# Patient Record
Sex: FEMALE | Race: WHITE | Employment: FULL TIME | URBAN - METROPOLITAN AREA
[De-identification: names, ages, dates, MRNs, and addresses within clinical notes are randomized per-mention and may not be internally consistent; named-entity substitution may affect disease eponyms.]

---

## 2017-12-28 ENCOUNTER — OFFICE VISIT (OUTPATIENT)
Dept: FAMILY MEDICINE CLINIC | Age: 27
End: 2017-12-28

## 2017-12-28 VITALS
BODY MASS INDEX: 37.22 KG/M2 | SYSTOLIC BLOOD PRESSURE: 134 MMHG | HEIGHT: 64 IN | TEMPERATURE: 97.9 F | WEIGHT: 218 LBS | DIASTOLIC BLOOD PRESSURE: 84 MMHG

## 2017-12-28 DIAGNOSIS — L72.3 SEBACEOUS CYST: Primary | ICD-10-CM

## 2017-12-28 DIAGNOSIS — R63.5 WEIGHT GAIN: ICD-10-CM

## 2017-12-28 LAB
GLUCOSE BLD-MCNC: 82 MG/DL (ref 70–99)
TSH SERPL DL<=0.05 MIU/L-ACNC: 2.66 UIU/ML (ref 0.27–4.2)

## 2017-12-28 PROCEDURE — G8484 FLU IMMUNIZE NO ADMIN: HCPCS | Performed by: FAMILY MEDICINE

## 2017-12-28 PROCEDURE — G8427 DOCREV CUR MEDS BY ELIG CLIN: HCPCS | Performed by: FAMILY MEDICINE

## 2017-12-28 PROCEDURE — 99213 OFFICE O/P EST LOW 20 MIN: CPT | Performed by: FAMILY MEDICINE

## 2017-12-28 PROCEDURE — 1036F TOBACCO NON-USER: CPT | Performed by: FAMILY MEDICINE

## 2017-12-28 PROCEDURE — G8417 CALC BMI ABV UP PARAM F/U: HCPCS | Performed by: FAMILY MEDICINE

## 2017-12-28 RX ORDER — CEPHALEXIN 500 MG/1
500 CAPSULE ORAL 3 TIMES DAILY
Qty: 30 CAPSULE | Refills: 0 | Status: SHIPPED | OUTPATIENT
Start: 2017-12-28 | End: 2018-01-29 | Stop reason: CLARIF

## 2017-12-28 RX ORDER — MELOXICAM 15 MG/1
15 TABLET ORAL
COMMUNITY
Start: 2017-10-24 | End: 2018-10-29 | Stop reason: CLARIF

## 2017-12-28 RX ORDER — METHOCARBAMOL 750 MG/1
750 TABLET, FILM COATED ORAL
COMMUNITY
Start: 2016-11-22 | End: 2018-10-29 | Stop reason: CLARIF

## 2017-12-29 LAB
ESTIMATED AVERAGE GLUCOSE: 111.2 MG/DL
HBA1C MFR BLD: 5.5 %

## 2018-01-29 ENCOUNTER — INITIAL CONSULT (OUTPATIENT)
Dept: SURGERY | Age: 28
End: 2018-01-29

## 2018-01-29 VITALS
DIASTOLIC BLOOD PRESSURE: 80 MMHG | HEIGHT: 64 IN | WEIGHT: 218 LBS | BODY MASS INDEX: 37.22 KG/M2 | SYSTOLIC BLOOD PRESSURE: 136 MMHG

## 2018-01-29 DIAGNOSIS — L98.9 SKIN LESION: Primary | ICD-10-CM

## 2018-01-29 PROCEDURE — 99243 OFF/OP CNSLTJ NEW/EST LOW 30: CPT | Performed by: SURGERY

## 2018-01-29 PROCEDURE — G8484 FLU IMMUNIZE NO ADMIN: HCPCS | Performed by: SURGERY

## 2018-01-29 PROCEDURE — G8417 CALC BMI ABV UP PARAM F/U: HCPCS | Performed by: SURGERY

## 2018-01-29 PROCEDURE — G8427 DOCREV CUR MEDS BY ELIG CLIN: HCPCS | Performed by: SURGERY

## 2018-02-01 ASSESSMENT — ENCOUNTER SYMPTOMS: COLOR CHANGE: 1

## 2018-02-01 NOTE — PROGRESS NOTES
Neck: Normal range of motion. Neck supple. No tracheal deviation present. No thyromegaly present. Cardiovascular: Normal rate and normal heart sounds. No murmur heard. Pulmonary/Chest: Effort normal and breath sounds normal.   Abdominal: Soft. She exhibits no distension. There is no tenderness. There is no guarding. Musculoskeletal: Normal range of motion. She exhibits no edema. Neurological: She is alert and oriented to person, place, and time. Skin: Skin is warm and dry. Psychiatric: She has a normal mood and affect. Thought content normal.       Assessment:      1. Skin lesion             Plan:      Removal in OR    The risks, benefits and alternatives to the planned procedure were discussed. Patient expressed an understanding and is willing to proceed.

## 2018-02-02 ENCOUNTER — HOSPITAL ENCOUNTER (OUTPATIENT)
Dept: SURGERY | Age: 28
Discharge: HOME | End: 2018-02-02
Attending: SURGERY | Admitting: SURGERY

## 2018-02-02 VITALS
TEMPERATURE: 97 F | HEART RATE: 69 BPM | BODY MASS INDEX: 37.22 KG/M2 | SYSTOLIC BLOOD PRESSURE: 104 MMHG | WEIGHT: 218 LBS | DIASTOLIC BLOOD PRESSURE: 62 MMHG | OXYGEN SATURATION: 99 % | RESPIRATION RATE: 18 BRPM | HEIGHT: 64 IN

## 2018-02-02 DIAGNOSIS — L98.9 SKIN LESION: Primary | ICD-10-CM

## 2018-02-02 LAB — PREGNANCY, URINE: NEGATIVE

## 2018-02-02 PROCEDURE — 11401 EXC TR-EXT B9+MARG 0.6-1 CM: CPT | Performed by: SURGERY

## 2018-02-02 RX ORDER — SODIUM CHLORIDE 9 MG/ML
INJECTION, SOLUTION INTRAVENOUS CONTINUOUS
Status: DISCONTINUED | OUTPATIENT
Start: 2018-02-02 | End: 2018-02-03 | Stop reason: HOSPADM

## 2018-02-02 RX ORDER — OXYCODONE HYDROCHLORIDE AND ACETAMINOPHEN 5; 325 MG/1; MG/1
2 TABLET ORAL PRN
Status: COMPLETED | OUTPATIENT
Start: 2018-02-02 | End: 2018-02-02

## 2018-02-02 RX ORDER — TRAMADOL HYDROCHLORIDE 50 MG/1
50 TABLET ORAL EVERY 6 HOURS PRN
Qty: 10 TABLET | Refills: 0 | Status: SHIPPED | OUTPATIENT
Start: 2018-02-02 | End: 2018-02-12

## 2018-02-02 RX ORDER — ONDANSETRON 2 MG/ML
4 INJECTION INTRAMUSCULAR; INTRAVENOUS
Status: ACTIVE | OUTPATIENT
Start: 2018-02-02 | End: 2018-02-02

## 2018-02-02 RX ORDER — SODIUM CHLORIDE 0.9 % (FLUSH) 0.9 %
10 SYRINGE (ML) INJECTION EVERY 12 HOURS SCHEDULED
Status: DISCONTINUED | OUTPATIENT
Start: 2018-02-02 | End: 2018-02-03 | Stop reason: HOSPADM

## 2018-02-02 RX ORDER — OXYCODONE HYDROCHLORIDE AND ACETAMINOPHEN 5; 325 MG/1; MG/1
1 TABLET ORAL PRN
Status: COMPLETED | OUTPATIENT
Start: 2018-02-02 | End: 2018-02-02

## 2018-02-02 RX ORDER — FENTANYL CITRATE 50 UG/ML
25 INJECTION, SOLUTION INTRAMUSCULAR; INTRAVENOUS EVERY 5 MIN PRN
Status: DISCONTINUED | OUTPATIENT
Start: 2018-02-02 | End: 2018-02-03 | Stop reason: HOSPADM

## 2018-02-02 RX ORDER — SODIUM CHLORIDE 0.9 % (FLUSH) 0.9 %
10 SYRINGE (ML) INJECTION PRN
Status: DISCONTINUED | OUTPATIENT
Start: 2018-02-02 | End: 2018-02-03 | Stop reason: HOSPADM

## 2018-02-02 RX ADMIN — SODIUM CHLORIDE: 9 INJECTION, SOLUTION INTRAVENOUS at 09:36

## 2018-02-02 RX ADMIN — OXYCODONE HYDROCHLORIDE AND ACETAMINOPHEN 1 TABLET: 5; 325 TABLET ORAL at 11:21

## 2018-02-02 ASSESSMENT — PAIN - FUNCTIONAL ASSESSMENT: PAIN_FUNCTIONAL_ASSESSMENT: 0-10

## 2018-02-02 ASSESSMENT — PAIN DESCRIPTION - FREQUENCY
FREQUENCY: CONTINUOUS
FREQUENCY: CONTINUOUS

## 2018-02-02 ASSESSMENT — PAIN DESCRIPTION - PROGRESSION
CLINICAL_PROGRESSION: GRADUALLY WORSENING
CLINICAL_PROGRESSION: NOT CHANGED

## 2018-02-02 ASSESSMENT — PAIN DESCRIPTION - LOCATION: LOCATION: INCISION;LEG

## 2018-02-02 ASSESSMENT — PAIN DESCRIPTION - PAIN TYPE
TYPE: SURGICAL PAIN
TYPE: SURGICAL PAIN

## 2018-02-02 ASSESSMENT — LIFESTYLE VARIABLES: SMOKING_STATUS: 1

## 2018-02-02 ASSESSMENT — PAIN SCALES - GENERAL
PAINLEVEL_OUTOF10: 4
PAINLEVEL_OUTOF10: 5
PAINLEVEL_OUTOF10: 5

## 2018-02-02 ASSESSMENT — ENCOUNTER SYMPTOMS: SHORTNESS OF BREATH: 0

## 2018-02-02 ASSESSMENT — PAIN DESCRIPTION - DESCRIPTORS: DESCRIPTORS: ACHING

## 2018-02-02 ASSESSMENT — PAIN DESCRIPTION - ORIENTATION: ORIENTATION: RIGHT

## 2018-02-02 NOTE — PROGRESS NOTES
Meets criteria for discharge from PACU. Will call for phase II bed.
Pt. Arrived in PACU. Awake, talking. On RA. Says, \"it was the best sleep ever. \" Incision in Rt. Groin intact. No drainage.
Received from PACU. Admitted to Phase 2 care. Awake and alert, respirations easy and even. Oriented to room and surroundings. States pain is tolerable presently.
Tolerating oral intake and being up in chair. Denies need. Discharge instructions given to patient and mother. Verbalize understanding.
established preoperatively. 23.  The patient/caregiver demonstrates knowledge of the expected responses to the operative or invasive procedure. 20.  Patient/Caregiver has reduced anxiety. Interventions-Familiarize with environment and equipment.   21.  Other:  22.  Other:

## 2018-02-02 NOTE — ANESTHESIA PRE-OP
136/80   12/28/17 134/84   03/07/16 110/72     BMI  There is no height or weight on file to calculate BMI. Estimated body mass index is 37.42 kg/m² as calculated from the following:    Height as of 1/29/18: 5' 4\" (1.626 m). Weight as of 1/29/18: 218 lb (98.9 kg). CBC No results found for: WBC, RBC, HGB, HCT, MCV, RDW, PLT  CMP    Lab Results   Component Value Date    GLUCOSE 82 12/28/2017     BMP    Lab Results   Component Value Date    GLUCOSE 82 12/28/2017     POCGlucose  No results for input(s): GLUCOSE in the last 72 hours. Coags  No results found for: PROTIME, INR, APTT  HCG (If Applicable) No results found for: PREGTESTUR, PREGSERUM, HCG, HCGQUANT   ABGs No results found for: PHART, PO2ART, WWO8SDO, KTS2BMR, BEART, T8ETDCGB   Type & Screen (If Applicable)  No results found for: LABABO, LABRH                         BMI: Wt Readings from Last 3 Encounters:       NPO Status: >8hrs                           Anesthesia Evaluation  Patient summary reviewed no history of anesthetic complications:   Airway: Mallampati: I  TM distance: >3 FB   Neck ROM: full  Mouth opening: > = 3 FB Dental:      Comment: No loose teeth    Pulmonary: breath sounds clear to auscultation  (+) current smoker    (-) COPD, asthma, shortness of breath, recent URI and sleep apnea          Patient smoked on day of surgery.                  Cardiovascular:        (-) hypertension, valvular problems/murmurs, past MI, CAD, CABG/stent, dysrhythmias,  angina,  CHF, orthopnea, PND and murmur      Rhythm: regular  Rate: normal                    Neuro/Psych:      (-) seizures, neuromuscular disease, TIA, CVA, headaches and psychiatric history           GI/Hepatic/Renal:        (-) GERD, PUD, hepatitis, liver disease, no renal disease and bowel prep       Endo/Other:        (-) hypothyroidism, hyperthyroidism, blood dyscrasia, arthritis, no Type II DM, no Type I DM               Abdominal:           Vascular:

## 2018-02-02 NOTE — ANESTHESIA POST-OP
[facility] Department of Anesthesiology  Post-Anesthesia Note       Name:  Eleno Guzmán                                         Age:  33 y.o.   MRN:  [id number]     Last Vitals & Oxygen Saturation: /62   Pulse 69   Temp 97 °F (36.1 °C) (Temporal)   Resp 18   Ht 5' 4\" (1.626 m)   Wt 218 lb (98.9 kg)   SpO2 99%   BMI 37.42 kg/m²   Patient Vitals for the past 4 hrs:   BP Temp Temp src Pulse Resp SpO2 Height Weight   02/02/18 1144 104/62 - - 69 18 99 % - -   02/02/18 1055 (!) 116/58 97 °F (36.1 °C) Temporal 71 18 98 % - -   02/02/18 1045 113/77 97 °F (36.1 °C) Temporal 77 21 97 % - -   02/02/18 1041 111/73 - - 84 23 98 % - -   02/02/18 1037 118/83 - - 89 14 98 % - -   02/02/18 1035 127/81 97 °F (36.1 °C) Temporal 85 19 97 % - -   02/02/18 0919 118/80 98.1 °F (36.7 °C) Oral 89 16 96 % 5' 4\" (1.626 m) 218 lb (98.9 kg)       Level of consciousness: awake, alert and oriented    Respiratory: stable     Cardiovascular: stable     Hydration: stable     PONV: stable     Post-op pain: adequate analgesia    Post-op assessment: no apparent anesthetic complications    Complications:  none    Yvonne Mendes MD  February 2, 2018   12:48 PM

## 2018-02-02 NOTE — BRIEF OP NOTE
Brief Postoperative Note    Mariana Vega  YOB: 1990  [id number]    Pre-operative Diagnosis: skin lesion right groin    Post-operative Diagnosis: Same    Procedure: removal of skin lesion right groin 1 cm    Anesthesia: MAC and Local    Surgeons/Assistants: Perla Ritter    Estimated Blood Loss: less than 50     Complications: None    Specimens: Was Obtained: skin lesion    Findings: no complications    Electronically signed by Fabio Ramos MD on 2/2/2018 at 10:26 AM

## 2018-02-03 NOTE — OP NOTE
[address] [place] [address]                                 OPERATIVE REPORT    PATIENT NAME: Rishi Mcmahon                 :        1990  MED REC NO:   [id number]                          ROOM:  ACCOUNT NO:   [de-identified]                          ADMIT DATE: 2018  PROVIDER:     Maricel Mendes MD    DATE OF PROCEDURE:  2018    PREOPERATIVE DIAGNOSIS:  Skin lesion, right groin. POSTOPERATIVE DIAGNOSIS:  Skin lesion, right groin. PROCEDURE PERFORMED:  Removal of skin lesion, right groin, 1 cm. SURGEON:  Maricel Mendes MD    SPECIMEN:  Skin lesion. COMPLICATIONS:  None. DISPOSITION:  To recovery in stable condition. INDICATION:  The patient is a 72-year-old female who has noted intermittent  flaring of a skin lesion in the right groin towards the perineum. She has  had at least three to four flare-ups over the last year. These have  resolved, however, there is a residual lesion, likely a sebaceous cyst  versus an area of hidradenitis in the right groin. The risks, benefits,  alternatives of removal were reviewed and she agreed to proceed. PROCEDURE:  The patient was brought to the operating room, placed supine,  sedation delivered. She was repositioned into lithotomy and the right  groin and perineum prepped and draped in a sterile fashion. Local  anesthetic was infused and an elliptical incision measuring 1.5 x 1 cm was  made around an obvious cystic lesion in the right groin limited to the  skin. This was carried into the deep subcutaneous layer and excised. Hemostasis was achieved and the wound was closed with 3-0 and 4-0 Vicryl  followed by [facility]. The patient was then transferred to recovery in  stable condition.         Erica Rayo MD    D: 2018 10:41:51       T: 2018 10:42:50     [id number]  Job#: [id number]     Doc#: [id number]    CC:

## 2018-03-05 ENCOUNTER — OFFICE VISIT (OUTPATIENT)
Dept: SURGERY | Age: 28
End: 2018-03-05

## 2018-03-05 DIAGNOSIS — L98.9 SKIN LESION: Primary | ICD-10-CM

## 2018-03-05 PROCEDURE — 99024 POSTOP FOLLOW-UP VISIT: CPT | Performed by: SURGERY

## 2018-03-05 NOTE — LETTER
[facility]  [address] [place] [id number]  Phone: [phone]  Fax: [phone]    Wilmer Colon MD        March 5, 2018     Patient: Hamzah Lopez   YOB: 1990   Date of Visit: 3/5/2018       To Whom It May Concern: It is my medical opinion that Candelaria Whittington was seen in the office today for a postop visit. Please excuse. If you have any questions or concerns, please don't hesitate to call.     Sincerely,        Wilmer Colon MD

## 2018-03-19 NOTE — PROGRESS NOTES
Subjective:      Patient ID: Era Parisi is a 33 y.o. female. HPI  Cyst removal site is clear. Well healed. Path negative for malignancy (inclusion cyst). Follow up with me as needed    Review of Systems    Objective:   Physical Exam    Assessment:      1.  Skin lesion             Plan:      Follow up with me as needed

## 2018-08-26 ENCOUNTER — HOSPITAL ENCOUNTER (EMERGENCY)
Age: 28
Discharge: HOME | End: 2018-08-26
Attending: EMERGENCY MEDICINE
Payer: COMMERCIAL

## 2018-08-26 VITALS
HEART RATE: 88 BPM | DIASTOLIC BLOOD PRESSURE: 72 MMHG | SYSTOLIC BLOOD PRESSURE: 119 MMHG | RESPIRATION RATE: 15 BRPM | BODY MASS INDEX: 38.09 KG/M2 | TEMPERATURE: 98.5 F | WEIGHT: 215 LBS | HEIGHT: 63 IN | OXYGEN SATURATION: 98 %

## 2018-08-26 DIAGNOSIS — L05.01 PILONIDAL CYST WITH ABSCESS: Primary | ICD-10-CM

## 2018-08-26 PROCEDURE — 99282 EMERGENCY DEPT VISIT SF MDM: CPT

## 2018-08-26 RX ORDER — NAPROXEN 500 MG/1
500 TABLET ORAL 2 TIMES DAILY
COMMUNITY
End: 2018-10-29 | Stop reason: CLARIF

## 2018-08-26 RX ORDER — SULFAMETHOXAZOLE AND TRIMETHOPRIM 800; 160 MG/1; MG/1
1 TABLET ORAL 2 TIMES DAILY
COMMUNITY
End: 2018-09-10 | Stop reason: CLARIF

## 2018-08-26 ASSESSMENT — PAIN DESCRIPTION - DESCRIPTORS: DESCRIPTORS: OTHER (COMMENT)

## 2018-08-26 ASSESSMENT — PAIN DESCRIPTION - LOCATION: LOCATION: BUTTOCKS

## 2018-08-26 ASSESSMENT — PAIN SCALES - GENERAL: PAINLEVEL_OUTOF10: 6

## 2018-08-26 ASSESSMENT — PAIN DESCRIPTION - PAIN TYPE: TYPE: CHRONIC PAIN

## 2018-08-26 ASSESSMENT — PAIN DESCRIPTION - FREQUENCY: FREQUENCY: INTERMITTENT

## 2018-09-10 ENCOUNTER — OFFICE VISIT (OUTPATIENT)
Dept: SURGERY | Age: 28
End: 2018-09-10

## 2018-09-10 VITALS
WEIGHT: 214 LBS | DIASTOLIC BLOOD PRESSURE: 80 MMHG | SYSTOLIC BLOOD PRESSURE: 118 MMHG | BODY MASS INDEX: 37.92 KG/M2 | HEIGHT: 63 IN

## 2018-09-10 DIAGNOSIS — L05.01 PILONIDAL ABSCESS: Primary | ICD-10-CM

## 2018-09-10 PROCEDURE — 99024 POSTOP FOLLOW-UP VISIT: CPT | Performed by: SURGERY

## 2018-09-10 ASSESSMENT — ENCOUNTER SYMPTOMS: COLOR CHANGE: 1

## 2018-09-10 NOTE — PROGRESS NOTES
Subjective:      Patient ID: Janeth Shaikh is a 33 y.o. female. HPI  CC: abscess    Patient know from prior treatment of groin abscess presents for follow up regarding pilonidal abscess. Noted pain, swelling in gluteal crease two weeks ago. Seen in ER for I&D and given antibiotics. Has nearly resolved at this point. No abscess seen on exam today. Possible pilonidal opening but still with some swelling so difficult to tell for sure. Will observe for now. The risks, benefits and alternatives to removal of the pilonidal were reviewed. Given this is her first episode and is resolving would note recommend intervention at this time. If the area flares up will consider removal of the pilonidal sinus tract. Past Medical History:   Diagnosis Date    Cyst near coccyx        Past Surgical History:   Procedure Laterality Date    CYST REMOVAL Right     groin     SPINAL FUSION  10/2016       Current Outpatient Prescriptions   Medication Sig Dispense Refill    naproxen (NAPROSYN) 500 MG tablet Take 500 mg by mouth 2 times daily      meloxicam (MOBIC) 15 MG tablet Take 15 mg by mouth      methocarbamol (ROBAXIN) 750 MG tablet Take 750 mg by mouth       No current facility-administered medications for this visit. No Known Allergies    Social History     Social History    Marital status: Single     Spouse name: N/A    Number of children: N/A    Years of education: N/A     Occupational History    Not on file.      Social History Main Topics    Smoking status: Current Every Day Smoker     Packs/day: 0.25     Types: Cigarettes    Smokeless tobacco: Never Used    Alcohol use Yes      Comment: 18 beers a weekend    Drug use: No    Sexual activity: Not on file     Other Topics Concern    Not on file     Social History Narrative    No narrative on file       Family History   Problem Relation Age of Onset    Cancer Maternal Aunt         breast ca    Diabetes Paternal Grandfather        Review of Systems

## 2018-10-22 ENCOUNTER — OFFICE VISIT (OUTPATIENT)
Dept: SURGERY | Age: 28
End: 2018-10-22
Payer: COMMERCIAL

## 2018-10-22 VITALS — DIASTOLIC BLOOD PRESSURE: 70 MMHG | SYSTOLIC BLOOD PRESSURE: 100 MMHG

## 2018-10-22 DIAGNOSIS — L05.01 PILONIDAL ABSCESS: Primary | ICD-10-CM

## 2018-10-22 PROCEDURE — 10080 I&D PILONIDAL CYST SIMPLE: CPT | Performed by: SURGERY

## 2018-10-22 RX ORDER — AMOXICILLIN AND CLAVULANATE POTASSIUM 875; 125 MG/1; MG/1
1 TABLET, FILM COATED ORAL 2 TIMES DAILY
Qty: 10 TABLET | Refills: 0 | Status: SHIPPED | OUTPATIENT
Start: 2018-10-22 | End: 2018-11-02 | Stop reason: SDUPTHER

## 2018-10-22 NOTE — PROGRESS NOTES
Subjective:      Patient ID: Darling Rick is a 33 y.o. female. HPI  Presents with recurring abscess in gluteal crease, presumed due to pilonidal. Had I&D in ER a few months ago and resolved but now with recurring pain and swelling. Fluctuant and erythematous at top of gluteal crease, both buttock but worse on the left at prior I&D site. Recommend repeat I&D. The risks, benefits and alternatives to the planned procedure were discussed. Patient expressed an understanding and is willing to proceed. Procedure  Incision and drainage done in office today. Under sterile conditions, local anesthesia infused and a one cm incision made over point of fluctuance located just left of gluteal crease. Purulence drained, wound packed with guaze. Dressing applied. No immediate complications. Patient instructed to remove packing in AM then cover with dry dressing. Drainage was limited due to patient's pain but most fluid was evacuated. If not resolving over next 2-3 days will need drainage in OR under sedation. Review of Systems    Objective:   Physical Exam    Assessment:       Diagnosis Orders   1.  Pilonidal abscess  HI DRAIN PILONIDAL CYST SIMPL           Plan:      Follow up in one week or sooner if symptoms do not improve        Maame Brown MD

## 2018-10-29 ENCOUNTER — OFFICE VISIT (OUTPATIENT)
Dept: SURGERY | Age: 28
End: 2018-10-29

## 2018-10-29 DIAGNOSIS — L05.01 PILONIDAL ABSCESS: Primary | ICD-10-CM

## 2018-10-29 PROCEDURE — 99024 POSTOP FOLLOW-UP VISIT: CPT | Performed by: SURGERY

## 2018-11-02 ENCOUNTER — TELEPHONE (OUTPATIENT)
Dept: SURGERY | Age: 28
End: 2018-11-02

## 2018-11-02 DIAGNOSIS — L05.91 PILONIDAL CYST: Primary | ICD-10-CM

## 2018-11-02 RX ORDER — AMOXICILLIN AND CLAVULANATE POTASSIUM 875; 125 MG/1; MG/1
1 TABLET, FILM COATED ORAL 2 TIMES DAILY
Qty: 14 TABLET | Refills: 0 | Status: SHIPPED | OUTPATIENT
Start: 2018-11-02 | End: 2018-11-09

## 2018-11-07 RX ORDER — MELOXICAM 15 MG/1
15 TABLET ORAL PRN
COMMUNITY
Start: 2018-04-30

## 2018-11-07 RX ORDER — CLINDAMYCIN PHOSPHATE 10 UG/ML
LOTION TOPICAL
COMMUNITY
End: 2020-10-13

## 2018-11-07 RX ORDER — CYCLOBENZAPRINE HCL 10 MG
10 TABLET ORAL EVERY EVENING
COMMUNITY
Start: 2017-08-18 | End: 2020-02-10 | Stop reason: CLARIF

## 2018-11-08 ENCOUNTER — ANESTHESIA EVENT (OUTPATIENT)
Dept: OPERATING ROOM | Age: 28
End: 2018-11-08
Payer: COMMERCIAL

## 2018-11-09 ENCOUNTER — ANESTHESIA (OUTPATIENT)
Dept: OPERATING ROOM | Age: 28
End: 2018-11-09
Payer: COMMERCIAL

## 2018-11-09 ENCOUNTER — HOSPITAL ENCOUNTER (OUTPATIENT)
Age: 28
Setting detail: OUTPATIENT SURGERY
Discharge: HOME | End: 2018-11-09
Attending: SURGERY | Admitting: SURGERY
Payer: COMMERCIAL

## 2018-11-09 VITALS
HEART RATE: 71 BPM | DIASTOLIC BLOOD PRESSURE: 79 MMHG | BODY MASS INDEX: 32.44 KG/M2 | OXYGEN SATURATION: 98 % | RESPIRATION RATE: 16 BRPM | TEMPERATURE: 97.2 F | SYSTOLIC BLOOD PRESSURE: 128 MMHG | HEIGHT: 64 IN | WEIGHT: 190 LBS

## 2018-11-09 VITALS — SYSTOLIC BLOOD PRESSURE: 102 MMHG | OXYGEN SATURATION: 99 % | DIASTOLIC BLOOD PRESSURE: 51 MMHG

## 2018-11-09 DIAGNOSIS — L05.01 PILONIDAL ABSCESS: Primary | ICD-10-CM

## 2018-11-09 LAB — PREGNANCY, URINE: NEGATIVE

## 2018-11-09 PROCEDURE — 7100000011 HC PHASE II RECOVERY - ADDTL 15 MIN: Performed by: SURGERY

## 2018-11-09 PROCEDURE — 3600000015 HC SURGERY LEVEL 5 ADDTL 15MIN: Performed by: SURGERY

## 2018-11-09 PROCEDURE — 3700000001 HC ADD 15 MINUTES (ANESTHESIA): Performed by: SURGERY

## 2018-11-09 PROCEDURE — 2709999900 HC NON-CHARGEABLE SUPPLY: Performed by: SURGERY

## 2018-11-09 PROCEDURE — 6360000002 HC RX W HCPCS: Performed by: NURSE ANESTHETIST, CERTIFIED REGISTERED

## 2018-11-09 PROCEDURE — 3600000005 HC SURGERY LEVEL 5 BASE: Performed by: SURGERY

## 2018-11-09 PROCEDURE — 7100000001 HC PACU RECOVERY - ADDTL 15 MIN: Performed by: SURGERY

## 2018-11-09 PROCEDURE — 2580000003 HC RX 258: Performed by: ANESTHESIOLOGY

## 2018-11-09 PROCEDURE — 2500000003 HC RX 250 WO HCPCS: Performed by: SURGERY

## 2018-11-09 PROCEDURE — 2500000003 HC RX 250 WO HCPCS: Performed by: ANESTHESIOLOGY

## 2018-11-09 PROCEDURE — 6360000002 HC RX W HCPCS: Performed by: SURGERY

## 2018-11-09 PROCEDURE — 6370000000 HC RX 637 (ALT 250 FOR IP): Performed by: ANESTHESIOLOGY

## 2018-11-09 PROCEDURE — 11770 EXC PILONIDAL CYST SIMPLE: CPT | Performed by: SURGERY

## 2018-11-09 PROCEDURE — S0028 INJECTION, FAMOTIDINE, 20 MG: HCPCS | Performed by: ANESTHESIOLOGY

## 2018-11-09 PROCEDURE — 7100000010 HC PHASE II RECOVERY - FIRST 15 MIN: Performed by: SURGERY

## 2018-11-09 PROCEDURE — 2580000003 HC RX 258: Performed by: SURGERY

## 2018-11-09 PROCEDURE — 84703 CHORIONIC GONADOTROPIN ASSAY: CPT

## 2018-11-09 PROCEDURE — 3700000000 HC ANESTHESIA ATTENDED CARE: Performed by: SURGERY

## 2018-11-09 PROCEDURE — 7100000000 HC PACU RECOVERY - FIRST 15 MIN: Performed by: SURGERY

## 2018-11-09 PROCEDURE — 2500000003 HC RX 250 WO HCPCS: Performed by: NURSE ANESTHETIST, CERTIFIED REGISTERED

## 2018-11-09 RX ORDER — OXYCODONE HYDROCHLORIDE 5 MG/1
5 TABLET ORAL EVERY 6 HOURS PRN
Qty: 12 TABLET | Refills: 0 | Status: SHIPPED | OUTPATIENT
Start: 2018-11-09 | End: 2018-11-12

## 2018-11-09 RX ORDER — FENTANYL CITRATE 50 UG/ML
25 INJECTION, SOLUTION INTRAMUSCULAR; INTRAVENOUS EVERY 5 MIN PRN
Status: DISCONTINUED | OUTPATIENT
Start: 2018-11-09 | End: 2018-11-09 | Stop reason: HOSPADM

## 2018-11-09 RX ORDER — OXYCODONE HYDROCHLORIDE AND ACETAMINOPHEN 5; 325 MG/1; MG/1
1 TABLET ORAL
Status: COMPLETED | OUTPATIENT
Start: 2018-11-09 | End: 2018-11-09

## 2018-11-09 RX ORDER — PROPOFOL 10 MG/ML
INJECTION, EMULSION INTRAVENOUS PRN
Status: DISCONTINUED | OUTPATIENT
Start: 2018-11-09 | End: 2018-11-09 | Stop reason: SDUPTHER

## 2018-11-09 RX ORDER — SODIUM CHLORIDE 9 MG/ML
INJECTION, SOLUTION INTRAVENOUS CONTINUOUS
Status: DISCONTINUED | OUTPATIENT
Start: 2018-11-09 | End: 2018-11-09 | Stop reason: HOSPADM

## 2018-11-09 RX ORDER — SODIUM CHLORIDE 0.9 % (FLUSH) 0.9 %
10 SYRINGE (ML) INJECTION EVERY 12 HOURS SCHEDULED
Status: DISCONTINUED | OUTPATIENT
Start: 2018-11-09 | End: 2018-11-09 | Stop reason: HOSPADM

## 2018-11-09 RX ORDER — LIDOCAINE HYDROCHLORIDE 20 MG/ML
INJECTION, SOLUTION EPIDURAL; INFILTRATION; INTRACAUDAL; PERINEURAL PRN
Status: DISCONTINUED | OUTPATIENT
Start: 2018-11-09 | End: 2018-11-09 | Stop reason: SDUPTHER

## 2018-11-09 RX ORDER — SODIUM CHLORIDE 0.9 % (FLUSH) 0.9 %
10 SYRINGE (ML) INJECTION PRN
Status: DISCONTINUED | OUTPATIENT
Start: 2018-11-09 | End: 2018-11-09 | Stop reason: HOSPADM

## 2018-11-09 RX ORDER — LABETALOL HYDROCHLORIDE 5 MG/ML
5 INJECTION, SOLUTION INTRAVENOUS EVERY 10 MIN PRN
Status: DISCONTINUED | OUTPATIENT
Start: 2018-11-09 | End: 2018-11-09 | Stop reason: HOSPADM

## 2018-11-09 RX ORDER — FENTANYL CITRATE 50 UG/ML
INJECTION, SOLUTION INTRAMUSCULAR; INTRAVENOUS PRN
Status: DISCONTINUED | OUTPATIENT
Start: 2018-11-09 | End: 2018-11-09 | Stop reason: SDUPTHER

## 2018-11-09 RX ORDER — MAGNESIUM HYDROXIDE 1200 MG/15ML
LIQUID ORAL CONTINUOUS PRN
Status: DISCONTINUED | OUTPATIENT
Start: 2018-11-09 | End: 2018-11-09 | Stop reason: HOSPADM

## 2018-11-09 RX ORDER — PROPOFOL 10 MG/ML
INJECTION, EMULSION INTRAVENOUS CONTINUOUS PRN
Status: DISCONTINUED | OUTPATIENT
Start: 2018-11-09 | End: 2018-11-09 | Stop reason: SDUPTHER

## 2018-11-09 RX ORDER — PROMETHAZINE HYDROCHLORIDE 25 MG/ML
6.25 INJECTION, SOLUTION INTRAMUSCULAR; INTRAVENOUS
Status: DISCONTINUED | OUTPATIENT
Start: 2018-11-09 | End: 2018-11-09 | Stop reason: HOSPADM

## 2018-11-09 RX ORDER — MIDAZOLAM HYDROCHLORIDE 1 MG/ML
INJECTION INTRAMUSCULAR; INTRAVENOUS PRN
Status: DISCONTINUED | OUTPATIENT
Start: 2018-11-09 | End: 2018-11-09 | Stop reason: SDUPTHER

## 2018-11-09 RX ORDER — GLYCOPYRROLATE 0.2 MG/ML
INJECTION INTRAMUSCULAR; INTRAVENOUS PRN
Status: DISCONTINUED | OUTPATIENT
Start: 2018-11-09 | End: 2018-11-09 | Stop reason: SDUPTHER

## 2018-11-09 RX ADMIN — Medication 2 G: at 12:00

## 2018-11-09 RX ADMIN — FAMOTIDINE 20 MG: 10 INJECTION INTRAVENOUS at 11:01

## 2018-11-09 RX ADMIN — FENTANYL CITRATE 100 MCG: 50 INJECTION INTRAMUSCULAR; INTRAVENOUS at 12:05

## 2018-11-09 RX ADMIN — PROPOFOL 100 MG: 10 INJECTION, EMULSION INTRAVENOUS at 12:05

## 2018-11-09 RX ADMIN — MIDAZOLAM 2 MG: 1 INJECTION INTRAMUSCULAR; INTRAVENOUS at 12:00

## 2018-11-09 RX ADMIN — GLYCOPYRROLATE 0.2 MG: 0.2 INJECTION, SOLUTION INTRAMUSCULAR; INTRAVENOUS at 12:00

## 2018-11-09 RX ADMIN — SODIUM CHLORIDE: 9 INJECTION, SOLUTION INTRAVENOUS at 11:03

## 2018-11-09 RX ADMIN — OXYCODONE HYDROCHLORIDE AND ACETAMINOPHEN 1 TABLET: 5; 325 TABLET ORAL at 13:20

## 2018-11-09 RX ADMIN — LIDOCAINE HYDROCHLORIDE 100 MG: 20 INJECTION, SOLUTION EPIDURAL; INFILTRATION; INTRACAUDAL; PERINEURAL at 12:05

## 2018-11-09 RX ADMIN — SODIUM CHLORIDE: 9 INJECTION, SOLUTION INTRAVENOUS at 11:53

## 2018-11-09 RX ADMIN — PROPOFOL 200 MCG/KG/MIN: 10 INJECTION, EMULSION INTRAVENOUS at 12:05

## 2018-11-09 ASSESSMENT — PULMONARY FUNCTION TESTS
PIF_VALUE: 0
PIF_VALUE: 1
PIF_VALUE: 0

## 2018-11-09 ASSESSMENT — PAIN - FUNCTIONAL ASSESSMENT: PAIN_FUNCTIONAL_ASSESSMENT: 0-10

## 2018-11-09 ASSESSMENT — PAIN DESCRIPTION - LOCATION: LOCATION: BUTTOCKS

## 2018-11-09 ASSESSMENT — PAIN SCALES - GENERAL
PAINLEVEL_OUTOF10: 7
PAINLEVEL_OUTOF10: 7
PAINLEVEL_OUTOF10: 4

## 2018-11-09 ASSESSMENT — PAIN DESCRIPTION - ONSET: ONSET: ON-GOING

## 2018-11-09 ASSESSMENT — PAIN DESCRIPTION - PROGRESSION: CLINICAL_PROGRESSION: GRADUALLY IMPROVING

## 2018-11-09 ASSESSMENT — PAIN DESCRIPTION - FREQUENCY: FREQUENCY: INTERMITTENT

## 2018-11-09 ASSESSMENT — PAIN DESCRIPTION - PAIN TYPE: TYPE: SURGICAL PAIN

## 2018-11-09 ASSESSMENT — PAIN DESCRIPTION - DESCRIPTORS: DESCRIPTORS: CONSTANT;DISCOMFORT

## 2018-11-09 NOTE — H&P
kg/m²   INTAKE/OUTPUT:   No intake/output data recorded. No intake/output data recorded. CONSTITUTIONAL:  awake, alert, no apparent distress and mildly obese  ENT:  normocepalic, without obvious abnormality  NECK:  supple, symmetrical, trachea midline   LUNGS:  clear to auscultation  CARDIOVASCULAR:  regular rate and rhythm and no murmur noted  ABDOMEN: soft, non tender  BUTTOCK: pilonidal abscess  MUSCULOSKELETAL:  0+ pitting edema lower extremities  NEUROLOGIC:  Mental Status Exam:  Level of Alertness:   awake  Orientation:   person, place, time      ASSESSMENT AND PLAN:    Pilonidal abscess  For I&D today  Possible removal of pilonidal sinus    The risks, benefits and alternatives to the planned procedure were discussed. Patient expressed an understanding and is willing to proceed.     Electronically signed by Jimena Ruiz MD on 11/9/2018 at 12:02 PM      Jimena Ruiz

## 2018-11-09 NOTE — PROGRESS NOTES
Pt awake. States pain 4/10 and will take po pain medication prior to discharge.   To phase 2 care per stretcher with RN assist.

## 2018-11-09 NOTE — ANESTHESIA PRE PROCEDURE
[facility] Department of Anesthesiology  Pre-Anesthesia Evaluation/Consultation       Name:  Zackary Vasquez  : 1990  Age:  33 y.o. MRN:  [id number]  Date: 2018           Surgeon: Surgeon(s):  Karen Gaston MD    Procedure: Procedure(s):  EXCISION OF PILONIDAL CYST Rick Do PICKING     No Known Allergies  Patient Active Problem List   Diagnosis    Skin lesion     Past Medical History:   Diagnosis Date    Cyst near coccyx      Past Surgical History:   Procedure Laterality Date    CYST REMOVAL Right     groin     SPINAL FUSION  10/2016    L4-L-5     Social History   Substance Use Topics    Smoking status: Current Every Day Smoker     Packs/day: 0.25     Years: 8.00     Types: Cigarettes    Smokeless tobacco: Never Used    Alcohol use Yes      Comment: socially     Medications  No current facility-administered medications on file prior to encounter.       Current Outpatient Prescriptions on File Prior to Encounter   Medication Sig Dispense Refill    amoxicillin-clavulanate (AUGMENTIN) 875-125 MG per tablet Take 1 tablet by mouth 2 times daily for 7 days 14 tablet 0     Current Facility-Administered Medications   Medication Dose Route Frequency Provider Last Rate Last Dose    0.9 % sodium chloride infusion   Intravenous Continuous Nadya Marquez  mL/hr at 18 1103      sodium chloride flush 0.9 % injection 10 mL  10 mL Intravenous 2 times per day Nadya Marquez MD        sodium chloride flush 0.9 % injection 10 mL  10 mL Intravenous PRN Nadya Marquez MD        ceFAZolin (ANCEF) 2 g in dextrose 5 % 100 mL IVPB  2 g Intravenous Once Karen Gaston MD         Vital Signs (Current)   Vitals:    18 1037   BP: (!) 145/79   Pulse: 84   Resp: 16   Temp: 96.9 °F (36.1 °C)   SpO2: 98%     Vital Signs Statistics (for past 48 hrs)     Temp  Av.9 °F (36.1 °C)  Min: 96.9 °F (36.1 °C)   Min taken time: 18 1037  Max: 96.9 °F

## 2018-11-09 NOTE — PROGRESS NOTES
Pt arrived to phase 2 from pacu a&o. VSS on monitor. Pt states pain 7/10 see mar. Dressing cdi. Pt offered snack and drink. Call light in reach. Boyfriend called to bedside.

## 2020-02-10 ENCOUNTER — OFFICE VISIT (OUTPATIENT)
Dept: SURGERY | Age: 30
End: 2020-02-10

## 2020-02-10 VITALS — SYSTOLIC BLOOD PRESSURE: 128 MMHG | DIASTOLIC BLOOD PRESSURE: 80 MMHG

## 2020-02-10 PROCEDURE — 99024 POSTOP FOLLOW-UP VISIT: CPT | Performed by: SURGERY

## 2020-02-10 RX ORDER — AMOXICILLIN AND CLAVULANATE POTASSIUM 500; 125 MG/1; MG/1
1 TABLET, FILM COATED ORAL 3 TIMES DAILY
Qty: 21 TABLET | Refills: 0 | Status: SHIPPED | OUTPATIENT
Start: 2020-02-10 | End: 2020-02-17

## 2020-10-13 ENCOUNTER — OFFICE VISIT (OUTPATIENT)
Dept: FAMILY MEDICINE CLINIC | Age: 30
End: 2020-10-13
Payer: COMMERCIAL

## 2020-10-13 VITALS
BODY MASS INDEX: 41.15 KG/M2 | SYSTOLIC BLOOD PRESSURE: 108 MMHG | DIASTOLIC BLOOD PRESSURE: 72 MMHG | WEIGHT: 241 LBS | HEIGHT: 64 IN | TEMPERATURE: 98.1 F

## 2020-10-13 PROCEDURE — 4004F PT TOBACCO SCREEN RCVD TLK: CPT | Performed by: INTERNAL MEDICINE

## 2020-10-13 PROCEDURE — G8484 FLU IMMUNIZE NO ADMIN: HCPCS | Performed by: INTERNAL MEDICINE

## 2020-10-13 PROCEDURE — G8427 DOCREV CUR MEDS BY ELIG CLIN: HCPCS | Performed by: INTERNAL MEDICINE

## 2020-10-13 PROCEDURE — 99213 OFFICE O/P EST LOW 20 MIN: CPT | Performed by: INTERNAL MEDICINE

## 2020-10-13 PROCEDURE — G8417 CALC BMI ABV UP PARAM F/U: HCPCS | Performed by: INTERNAL MEDICINE

## 2020-10-13 RX ORDER — BACLOFEN 10 MG/1
20 TABLET ORAL NIGHTLY
COMMUNITY

## 2020-10-13 RX ORDER — SPIRONOLACTONE 50 MG/1
50 TABLET, FILM COATED ORAL DAILY
COMMUNITY

## 2020-10-13 ASSESSMENT — PATIENT HEALTH QUESTIONNAIRE - PHQ9
SUM OF ALL RESPONSES TO PHQ9 QUESTIONS 1 & 2: 0
SUM OF ALL RESPONSES TO PHQ QUESTIONS 1-9: 0
1. LITTLE INTEREST OR PLEASURE IN DOING THINGS: 0
SUM OF ALL RESPONSES TO PHQ QUESTIONS 1-9: 0
2. FEELING DOWN, DEPRESSED OR HOPELESS: 0

## 2020-10-13 ASSESSMENT — ENCOUNTER SYMPTOMS
SINUS PAIN: 0
RHINORRHEA: 0

## 2020-10-13 NOTE — PROGRESS NOTES
Subjective:      Patient ID: Gisella Bull is a 35 y.o. female. HPI   Chief Complaint   Patient presents with    Loss of Vision     patient c/o vision loss- right eye x 3 days    Numbness     patient c/o numbness in right arm and face; ? back issues per patient   had blurred vision lasted for 20 minutes . Has had several episodes of numbness in her face and R arm lasting 10 mins. Gisella Bull is a 35 y.o. female with the following history as recorded in [facility]:  Patient Active Problem List    Diagnosis Date Noted    Pilonidal abscess     Skin lesion      Current Outpatient Medications   Medication Sig Dispense Refill    baclofen (LIORESAL) 10 MG tablet Take 20 mg by mouth nightly      spironolactone (ALDACTONE) 50 MG tablet Take 50 mg by mouth daily      meloxicam (MOBIC) 15 MG tablet Take 15 mg by mouth as needed        No current facility-administered medications for this visit. Allergies: Patient has no known allergies. Past Medical History:   Diagnosis Date    Cyst near coccyx      Past Surgical History:   Procedure Laterality Date    CYST REMOVAL Right     groin     ID REMV PILONIDAL LESION COMPLIC N/A 64/1/7524    EXCISION OF PILONIDAL CYST /PIT PICKING performed by Javier Sheffield MD at [facility]  10/2016    L4-L-5     Family History   Problem Relation Age of Onset    Cancer Maternal Aunt         breast ca    Diabetes Paternal Grandfather      Social History     Tobacco Use    Smoking status: Current Every Day Smoker     Packs/day: 0.25     Years: 8.00     Pack years: 2.00     Types: Cigarettes    Smokeless tobacco: Never Used   Substance Use Topics    Alcohol use: Yes     Comment: socially       Review of Systems   Constitutional: Negative for chills, diaphoresis and fatigue. HENT: Negative for congestion, postnasal drip, rhinorrhea and sinus pain.     Eyes:        Patient presents with:  Loss of Vision: patient c/o vision loss- right eye x 3 days  Numbness: patient c/o numbness in right arm and face; ? back issues per patient     Genitourinary: Negative for dysuria and frequency. Neurological:        Patient presents with:  Loss of Vision: patient c/o vision loss- right eye x 3 days  Numbness: patient c/o numbness in right arm and face; ? back issues per patient         Objective:   Physical Exam  Constitutional:       General: She is not in acute distress. Appearance: Normal appearance. She is not ill-appearing. HENT:      Head: Normocephalic and atraumatic. Eyes:      Extraocular Movements: Extraocular movements intact. Pupils: Pupils are equal, round, and reactive to light. Neck:      Musculoskeletal: No neck rigidity. Cardiovascular:      Rate and Rhythm: Regular rhythm. Heart sounds: No murmur. Pulmonary:      Effort: No respiratory distress. Breath sounds: No wheezing. Abdominal:      General: There is no distension. Tenderness: There is no abdominal tenderness. There is no guarding. Lymphadenopathy:      Cervical: No cervical adenopathy. Neurological:      General: No focal deficit present. Mental Status: She is alert and oriented to person, place, and time. Cranial Nerves: No cranial nerve deficit. Motor: No weakness. Assessment:       Diagnosis Orders   1. Vision loss of right eye  MRI BRAIN W WO CONTRAST   2. Right facial numbness  MRI BRAIN W WO CONTRAST   3. Right arm numbness  MRI BRAIN W WO CONTRAST         Plan:      Outpatient Encounter Medications as of 10/13/2020   Medication Sig Dispense Refill    baclofen (LIORESAL) 10 MG tablet Take 20 mg by mouth nightly      spironolactone (ALDACTONE) 50 MG tablet Take 50 mg by mouth daily      meloxicam (MOBIC) 15 MG tablet Take 15 mg by mouth as needed       [DISCONTINUED] clindamycin (CLEOCIN T) 1 % lotion clindamycin 1 % lotion       No facility-administered encounter medications on file as of 10/13/2020.       Orders Placed

## 2020-10-13 NOTE — PATIENT INSTRUCTIONS
Thank you for choosing [facility]. Please bring a current list of medications to every appointment. Please contact your pharmacy for any prescription refill(s) that you are requesting. Cigarette Smoking and Its Health Risks   GENERAL INFORMATION:   Smoking and your health: Cigarette smoking is the most preventable cause of illness and death in the [place]. A large number of Americans smoke cigarettes, and each year more than one million children and adults start smoking cigarettes. Many people die every year from illnesses caused by smoking. People who smoke die earlier than those who do not smoke. The risk of disease increases if you smoke a lot, inhale deeply, or have smoked many years. Why are cigarettes bad for you? Cigarettes are filled with poison that goes into the lungs when you inhale. Coughing, dizziness, and burning of the eyes, nose, and throat are early signs that smoking is harming you. Smoking increases your health risks if you have diabetes, high blood pressure, or high blood cholesterol. The long-term problems of smoking cigarettes are the following:   Cancer: Smoking increases your chances of getting cancer. Cigarette smoking may play a role in developing many kinds of cancer. Lung cancer is the most common kind of cancer caused by smoking. A smoker is at greater risk of getting cancer of the lips, mouth, throat, or voice box. Smokers also have a higher risk of getting esophagus, stomach, kidney, pancreas, cervix, bladder, and skin cancer. Heart and blood vessel disease: If you already have heart or blood vessel problems and smoke, you are at even greater risk of having continued or worse health problems. The nicotine in the tobacco causes an increase in your heart rate and blood pressure. The arteries (blood vessels) in your arms and legs tighten and narrow because of the nicotine in cigarette smoke.  Cigarette smoke increases blood clotting, and may damage the lining of your heart's arteries and other blood vessels. Carbon monoxide is a harmful gas that gets into the blood and decreases oxygen going to the heart and the body. Cigarette smoke contains this gas. Hardening of the arteries happens more often in smokers than in nonsmokers. This may make it more likely for you to have a stroke (blood clot in your brain). The more cigarettes you smoke, the greater your risk of a heart attack. Lung disease: The younger you are when you start smoking, the greater your risk of getting lung diseases. Many smokers have a cough which is caused by the chemicals in smoke. These chemicals harm the cilia (tiny hairs) that line the lungs and help remove dirt and waste products. Depending upon how much you smoke, your lungs become gray and \"dirty\" (they look like charcoal). Healthy lungs are pink. Chronic bronchitis is a serious lung infection which is often caused by smoking. Emphysema is a long-term lung disease that may be caused by smoking cigarettes. Cigarette smoking also makes asthma worse. You are at a higher risk of getting colds, pneumonia, and other lung infections if you smoke. Gastrointestinal disease: Cigarette smoking increases the amount of acid that is made by your stomach, and may cause a peptic ulcer. A peptic ulcer is an open sore in the stomach or duodenum (part of the intestine). You may also get gastroesophageal reflux from smoking. This is when you have a backflow of stomach acid into your esophagus (food tube). Other problems: The following are other problems that smoking may cause: Bad breath. Bad smell in your clothes, hair, and skin. Decreased ability to play sports or do physical activities because of breathing problems. Earlier than normal wrinkling of the skin, usually the face. Higher risk of bone fractures, such as hip, wrist, or spine. Higher risk of starting a fire.  This may happen if you fall asleep with a lit cigarette. Men may have problems having an erection. Sleeping problems. Smoking is an expensive (costly) habit. You will save money if you choose to stop smoking. Sore throat. Staining of teeth. Women and smoking: You may have a higher risk of having a heart attack or stroke if you smoke and use birth control pills. This risk is more serious if you are 35 years or older. The risk of losing your unborn baby or having a stillborn baby is higher if you are pregnant and smoke. Babies born to smoking mothers often weigh less, and are at a higher risk of sudden infant death syndrome (SIDS). You may have a harder time getting pregnant if you are a smoker. Women who smoke may have a higher risk of osteoporosis (also known as \"brittle bones\"). Women who smoke also have a higher risk of incontinence, which is when you are unable to control when you urinate. Are there risks with smoking cigars or pipes? The risks are the same for people who smoke cigars or pipes as they are for cigarette smokers. There is a risk of getting cancer of the mouth, lip, larynx (voice box), or esophagus if you smoke a cigar or pipe. What are the risks of using snuff or chewing tobacco (\"smokeless tobacco\")? People who use snuff or chewing tobacco have an increased risk of getting mouth or throat cancer. The risk of heart disease, stroke, blood vessel disease and stomach problems is the same as it is for cigarette smokers. What is \"passive smoking\"? Tobacco smoke is dangerous to others. The effect that smoking has on nonsmokers is called \"passive smoking\". Nonsmokers who breathe tobacco smoke have the same health risks as smokers. Children who are around tobacco smoke may have more colds, ear infections, or other breathing problems. Why should I quit smoking? The benefits from quitting smoking happen right away. Your sense of taste and smell will improve. Your body, clothes, car, and home will not smell of tobacco smoke.  Your otherwise used for commercial purposes. The above information is an  only. It is not intended as medical advice for individual conditions or treatments. Talk to your doctor, nurse or pharmacist before following any medical regimen to see if it is safe and effective for you.

## 2020-10-22 ENCOUNTER — TELEPHONE (OUTPATIENT)
Dept: FAMILY MEDICINE CLINIC | Age: 30
End: 2020-10-22

## (undated) DEVICE — COVER LT HNDL BLU PLAS

## (undated) DEVICE — Z DISCONTINUED BY MEDLINE USE 2711682 TRAY SKIN PREP DRY W/ PREM GLV

## (undated) DEVICE — SHEET, T, LAPAROTOMY, STERILE: Brand: MEDLINE

## (undated) DEVICE — SYRINGE IRRIG 60ML SFT PLIABLE BLB EZ TO GRP 1 HND USE W/

## (undated) DEVICE — SPONGE LAP W18XL18IN WHT COT 4 PLY FLD STRUNG RADPQ DISP ST

## (undated) DEVICE — SPONGE GZ W4XL4IN COT 12 PLY TYP VII WVN C FLD DSGN

## (undated) DEVICE — ELECTRODE PT RET AD L9FT HI MOIST COND ADH HYDRGEL CORDED

## (undated) DEVICE — SOLUTION SCRB 4OZ 10% POVIDONE IOD ANTIMIC BTL

## (undated) DEVICE — SOLUTION PREP POVIDONE IOD FOR SKIN MUCOUS MEM PRIOR TO

## (undated) DEVICE — SYRINGE MED 20ML STD CLR PLAS LUERLOCK TIP N CTRL DISP

## (undated) DEVICE — SOLUTION IV IRRIG POUR BRL 0.9% SODIUM CHL 2F7124

## (undated) DEVICE — NEEDLE HYPO 25GA L1.5IN BVL ORIENTED ECLIPSE

## (undated) DEVICE — MINOR SET UP PK

## (undated) DEVICE — PENCIL ES L3M BTTN SWCH S STL HEX LOK BLDE ELECTRD HOLSTER

## (undated) DEVICE — PAD,ABDOMINAL,8"X7.5",STERILE,LF,1/PK: Brand: MEDLINE

## (undated) DEVICE — TIP CLN S STL ES DEV

## (undated) DEVICE — SPEC CONT WIDE MOUTH 4OZ 300/CS 20/PLT: Brand: MEDEGEN MEDICAL PRODUCTS, LLC

## (undated) DEVICE — KIT OR ROOM TURNOVER W/STRAP

## (undated) DEVICE — INTENDED FOR TISSUE SEPARATION, AND OTHER PROCEDURES THAT REQUIRE A SHARP SURGICAL BLADE TO PUNCTURE OR CUT.: Brand: BARD-PARKER ® STAINLESS STEEL BLADES

## (undated) DEVICE — GLOVE SURG SZ 7 L12IN FNGR THK94MIL STD WHT LTX SYN POLYMER